# Patient Record
Sex: FEMALE | Race: WHITE | NOT HISPANIC OR LATINO
[De-identification: names, ages, dates, MRNs, and addresses within clinical notes are randomized per-mention and may not be internally consistent; named-entity substitution may affect disease eponyms.]

---

## 2021-11-18 ENCOUNTER — NON-APPOINTMENT (OUTPATIENT)
Age: 56
End: 2021-11-18

## 2021-12-01 PROBLEM — Z00.00 ENCOUNTER FOR PREVENTIVE HEALTH EXAMINATION: Status: ACTIVE | Noted: 2021-12-01

## 2021-12-06 ENCOUNTER — APPOINTMENT (OUTPATIENT)
Dept: OBGYN | Facility: CLINIC | Age: 56
End: 2021-12-06
Payer: COMMERCIAL

## 2021-12-06 VITALS
BODY MASS INDEX: 47.09 KG/M2 | WEIGHT: 293 LBS | DIASTOLIC BLOOD PRESSURE: 82 MMHG | HEIGHT: 66 IN | SYSTOLIC BLOOD PRESSURE: 147 MMHG

## 2021-12-06 DIAGNOSIS — Z78.9 OTHER SPECIFIED HEALTH STATUS: ICD-10-CM

## 2021-12-06 DIAGNOSIS — Z82.49 FAMILY HISTORY OF ISCHEMIC HEART DISEASE AND OTHER DISEASES OF THE CIRCULATORY SYSTEM: ICD-10-CM

## 2021-12-06 DIAGNOSIS — Z83.3 FAMILY HISTORY OF DIABETES MELLITUS: ICD-10-CM

## 2021-12-06 PROCEDURE — 99204 OFFICE O/P NEW MOD 45 MIN: CPT

## 2021-12-06 NOTE — HISTORY OF PRESENT ILLNESS
[FreeTextEntry1] : 56 year-old patient who had a Pap smear in November 2021 else here is here complaining of postmenopausal bleeding which is going on for almost a year and it has gotten worse.\par Patient states in November 2021 the blood was bright red.\par Patient denies any pain .\par Patient to obtain her Pap smear results and sonogram results from last year. [Abnormal Quantity] : abnormal quantity [TextBox_28] : obesity [Currently In Menopause] : currently in menopause [Menopause Age: ____] : age at menopause was [unfilled]

## 2021-12-06 NOTE — DISCUSSION/SUMMARY
[FreeTextEntry1] : Postmenopausal bleeding was discussed with patient in detail.\par Work-up was discussed with patient in detail,\par Recommend transvaginal sonogram, D&C was discussed with the patient.\par All questions were answered.

## 2021-12-14 PROBLEM — Z00.00 ENCOUNTER FOR PREVENTIVE HEALTH EXAMINATION: Noted: 2021-12-14

## 2021-12-27 ENCOUNTER — ASOB RESULT (OUTPATIENT)
Age: 56
End: 2021-12-27

## 2021-12-27 ENCOUNTER — APPOINTMENT (OUTPATIENT)
Dept: OBGYN | Facility: CLINIC | Age: 56
End: 2021-12-27
Payer: COMMERCIAL

## 2021-12-27 PROCEDURE — 76830 TRANSVAGINAL US NON-OB: CPT

## 2021-12-29 ENCOUNTER — NON-APPOINTMENT (OUTPATIENT)
Age: 56
End: 2021-12-29

## 2022-01-11 ENCOUNTER — APPOINTMENT (OUTPATIENT)
Dept: OBGYN | Facility: CLINIC | Age: 57
End: 2022-01-11
Payer: COMMERCIAL

## 2022-01-11 VITALS
WEIGHT: 293 LBS | SYSTOLIC BLOOD PRESSURE: 128 MMHG | HEIGHT: 66 IN | BODY MASS INDEX: 47.09 KG/M2 | DIASTOLIC BLOOD PRESSURE: 83 MMHG

## 2022-01-11 PROCEDURE — 99214 OFFICE O/P EST MOD 30 MIN: CPT

## 2022-01-11 NOTE — PLAN
[FreeTextEntry1] : PMB/ thickened Endometrial lining: Differential diagnosis including benign proliferative, polyp, precancerous tissue and malignancy discussed. Endometrial biopsy vs  D&C discussed. Due to orientation of cervix and visualization, it was recommended that the patient have D&C. Patient in agreement and procedure discussed. Patient to be booked for Hysteroscopy, D&C \par \par GYN counseling: Patient instructed to call for Unusual Pelvic pain,  UTI symptoms, irregular vaginal bleeding/discharge- Patient verbalized agreement\par \par F/U: patient to follow up post op\par \par

## 2022-01-11 NOTE — HISTORY OF PRESENT ILLNESS
[FreeTextEntry1] : 57yo female [id number] postmenopausal patient of Dr. Vega presents for surgical consult\par Patient reports PMB that started about 1 year ago with sporadic minimal dark spotting. IN November 2021 she noticed an increase of spotting with bright red blood. TVUS done 1 year ago at another office showed uterine lining was wnl. \par \par Denies HTN, DM, LEO\par \par TVUS in December 2021 endo lining 8.28, small posterior IM fibroid\par \par ROS: Denies fever/chills, HA, Cough/sore throat, CP, SOB, N/V, Diarrhea/Constipation, Pelvic pain, Urinary frequency/ urgency/ Incontinence, irregular discharge \par \par

## 2022-01-11 NOTE — PHYSICAL EXAM
[Chaperone Present] : A chaperone was present in the examining room during all aspects of the physical examination [Appropriately responsive] : appropriately responsive [Alert] : alert [No Acute Distress] : no acute distress [Soft] : soft [Non-tender] : non-tender [Non-distended] : non-distended [Oriented x3] : oriented x3 [Examination Of The Breasts] : a normal appearance [No Discharge] : no discharge [No Masses] : no breast masses were palpable [Normal] : normal [FreeTextEntry1] : Pan adiposity [FreeTextEntry5] : cervix difficult to reach with large speculum and anterior in orientation

## 2022-02-04 ENCOUNTER — OUTPATIENT (OUTPATIENT)
Dept: OUTPATIENT SERVICES | Facility: HOSPITAL | Age: 57
LOS: 1 days | End: 2022-02-04

## 2022-02-04 VITALS
WEIGHT: 293 LBS | TEMPERATURE: 98 F | HEIGHT: 66 IN | DIASTOLIC BLOOD PRESSURE: 80 MMHG | RESPIRATION RATE: 17 BRPM | HEART RATE: 78 BPM | OXYGEN SATURATION: 98 % | SYSTOLIC BLOOD PRESSURE: 164 MMHG

## 2022-02-04 DIAGNOSIS — N95.9 UNSPECIFIED MENOPAUSAL AND PERIMENOPAUSAL DISORDER: ICD-10-CM

## 2022-02-04 DIAGNOSIS — R93.89 ABNORMAL FINDINGS ON DIAGNOSTIC IMAGING OF OTHER SPECIFIED BODY STRUCTURES: ICD-10-CM

## 2022-02-04 DIAGNOSIS — Z98.891 HISTORY OF UTERINE SCAR FROM PREVIOUS SURGERY: Chronic | ICD-10-CM

## 2022-02-04 DIAGNOSIS — Z90.49 ACQUIRED ABSENCE OF OTHER SPECIFIED PARTS OF DIGESTIVE TRACT: Chronic | ICD-10-CM

## 2022-02-04 DIAGNOSIS — E66.01 MORBID (SEVERE) OBESITY DUE TO EXCESS CALORIES: ICD-10-CM

## 2022-02-04 DIAGNOSIS — R03.0 ELEVATED BLOOD-PRESSURE READING, WITHOUT DIAGNOSIS OF HYPERTENSION: ICD-10-CM

## 2022-02-04 DIAGNOSIS — Z98.890 OTHER SPECIFIED POSTPROCEDURAL STATES: Chronic | ICD-10-CM

## 2022-02-04 DIAGNOSIS — Z90.89 ACQUIRED ABSENCE OF OTHER ORGANS: Chronic | ICD-10-CM

## 2022-02-04 LAB
ANION GAP SERPL CALC-SCNC: 13 MMOL/L — SIGNIFICANT CHANGE UP (ref 7–14)
BUN SERPL-MCNC: 13 MG/DL — SIGNIFICANT CHANGE UP (ref 7–23)
CALCIUM SERPL-MCNC: 10.1 MG/DL — SIGNIFICANT CHANGE UP (ref 8.4–10.5)
CHLORIDE SERPL-SCNC: 104 MMOL/L — SIGNIFICANT CHANGE UP (ref 98–107)
CO2 SERPL-SCNC: 24 MMOL/L — SIGNIFICANT CHANGE UP (ref 22–31)
CREAT SERPL-MCNC: 0.77 MG/DL — SIGNIFICANT CHANGE UP (ref 0.5–1.3)
GLUCOSE SERPL-MCNC: 99 MG/DL — SIGNIFICANT CHANGE UP (ref 70–99)
HCT VFR BLD CALC: 48.4 % — HIGH (ref 34.5–45)
HGB BLD-MCNC: 16.1 G/DL — HIGH (ref 11.5–15.5)
MCHC RBC-ENTMCNC: 29.3 PG — SIGNIFICANT CHANGE UP (ref 27–34)
MCHC RBC-ENTMCNC: 33.3 GM/DL — SIGNIFICANT CHANGE UP (ref 32–36)
MCV RBC AUTO: 88.2 FL — SIGNIFICANT CHANGE UP (ref 80–100)
NRBC # BLD: 0 /100 WBCS — SIGNIFICANT CHANGE UP
NRBC # FLD: 0 K/UL — SIGNIFICANT CHANGE UP
PLATELET # BLD AUTO: 208 K/UL — SIGNIFICANT CHANGE UP (ref 150–400)
POTASSIUM SERPL-MCNC: 4.2 MMOL/L — SIGNIFICANT CHANGE UP (ref 3.5–5.3)
POTASSIUM SERPL-SCNC: 4.2 MMOL/L — SIGNIFICANT CHANGE UP (ref 3.5–5.3)
RBC # BLD: 5.49 M/UL — HIGH (ref 3.8–5.2)
RBC # FLD: 12.3 % — SIGNIFICANT CHANGE UP (ref 10.3–14.5)
SODIUM SERPL-SCNC: 141 MMOL/L — SIGNIFICANT CHANGE UP (ref 135–145)
WBC # BLD: 6.59 K/UL — SIGNIFICANT CHANGE UP (ref 3.8–10.5)
WBC # FLD AUTO: 6.59 K/UL — SIGNIFICANT CHANGE UP (ref 3.8–10.5)

## 2022-02-04 RX ORDER — SODIUM CHLORIDE 9 MG/ML
1000 INJECTION, SOLUTION INTRAVENOUS
Refills: 0 | Status: DISCONTINUED | OUTPATIENT
Start: 2022-02-21 | End: 2022-03-07

## 2022-02-04 NOTE — H&P PST ADULT - PROBLEM SELECTOR PLAN 1
Pt is tentatively scheduled for  Dilation and curettage Hysteroscopy with myosure on 02/21/22.  Pepcid provided for GI prophylaxis.   Pre op instructions given  Pt strongly advised to follow up with surgeon to discuss COVID testing requirements prior to procedure.

## 2022-02-04 NOTE — H&P PST ADULT - NSICDXPASTMEDICALHX_GEN_ALL_CORE_FT
PAST MEDICAL HISTORY:  2019 novel coronavirus disease (COVID-19) 03/2020- with mild respiratory complications    Obesity

## 2022-02-04 NOTE — H&P PST ADULT - NSICDXFAMILYHX_GEN_ALL_CORE_FT
FAMILY HISTORY:  FH: hypertension    Father  Still living? Yes, Estimated age: Age Unknown  FH: type 2 diabetes, Age at diagnosis: Age Unknown

## 2022-02-04 NOTE — H&P PST ADULT - NSICDXPASTSURGICALHX_GEN_ALL_CORE_FT
PAST SURGICAL HISTORY:  H/O  section x3    S/P cholecystectomy     S/P knee surgery     S/P tonsillectomy

## 2022-02-04 NOTE — H&P PST ADULT - MALLAMPATI CLASS
Class II - visualization of the soft palate, fauces, and uvula short neck - 18 cm/Class II - visualization of the soft palate, fauces, and uvula

## 2022-02-04 NOTE — H&P PST ADULT - HISTORY OF PRESENT ILLNESS
56 year old female with Obesity, Anxiety presents to PST with pre op diagnosis of abnormal findings on pelvic sonogram, for pre op evaluation prior to scheduled surgery- Dilation and curettage Hysteroscopy with myosure. Pt reports of abnormal post menopausal bleeding.

## 2022-02-04 NOTE — H&P PST ADULT - ASSESSMENT
pre op diagnosis of abnormal findings on pelvic sonogram, for pre op evaluation prior to scheduled surgery- Dilation and curettage Hysteroscopy with myosure.

## 2022-02-04 NOTE — H&P PST ADULT - PROBLEM SELECTOR PLAN 2
LEO Precautions  OR booking notifies Patient has elevated BP at PST x2, patient has never diagnosed with HTN.    Patient with morbid Obesity and elevated BP- requesting medical evaluation prior to surgery.

## 2022-02-18 NOTE — ASU PATIENT PROFILE, ADULT - FALL HARM RISK - UNIVERSAL INTERVENTIONS
Bed in lowest position, wheels locked, appropriate side rails in place/Call bell, personal items and telephone in reach/Instruct patient to call for assistance before getting out of bed or chair/Non-slip footwear when patient is out of bed/[place] to call system/Physically safe environment - no spills, clutter or unnecessary equipment/Purposeful Proactive Rounding/Room/bathroom lighting operational, light cord in reach

## 2022-02-20 ENCOUNTER — TRANSCRIPTION ENCOUNTER (OUTPATIENT)
Age: 57
End: 2022-02-20

## 2022-02-21 ENCOUNTER — OUTPATIENT (OUTPATIENT)
Dept: OUTPATIENT SERVICES | Facility: HOSPITAL | Age: 57
LOS: 1 days | Discharge: HOME | End: 2022-02-21
Payer: COMMERCIAL

## 2022-02-21 ENCOUNTER — RESULT REVIEW (OUTPATIENT)
Age: 57
End: 2022-02-21

## 2022-02-21 VITALS
SYSTOLIC BLOOD PRESSURE: 151 MMHG | DIASTOLIC BLOOD PRESSURE: 85 MMHG | HEART RATE: 84 BPM | WEIGHT: 293 LBS | RESPIRATION RATE: 18 BRPM | HEIGHT: 66 IN | TEMPERATURE: 98 F | OXYGEN SATURATION: 97 %

## 2022-02-21 VITALS
OXYGEN SATURATION: 98 % | RESPIRATION RATE: 19 BRPM | SYSTOLIC BLOOD PRESSURE: 156 MMHG | DIASTOLIC BLOOD PRESSURE: 76 MMHG | HEART RATE: 93 BPM

## 2022-02-21 DIAGNOSIS — Z90.49 ACQUIRED ABSENCE OF OTHER SPECIFIED PARTS OF DIGESTIVE TRACT: Chronic | ICD-10-CM

## 2022-02-21 DIAGNOSIS — R93.89 ABNORMAL FINDINGS ON DIAGNOSTIC IMAGING OF OTHER SPECIFIED BODY STRUCTURES: ICD-10-CM

## 2022-02-21 DIAGNOSIS — Z98.890 OTHER SPECIFIED POSTPROCEDURAL STATES: Chronic | ICD-10-CM

## 2022-02-21 DIAGNOSIS — Z98.891 HISTORY OF UTERINE SCAR FROM PREVIOUS SURGERY: Chronic | ICD-10-CM

## 2022-02-21 DIAGNOSIS — Z90.89 ACQUIRED ABSENCE OF OTHER ORGANS: Chronic | ICD-10-CM

## 2022-02-21 PROCEDURE — 88305 TISSUE EXAM BY PATHOLOGIST: CPT | Mod: 26

## 2022-02-21 PROCEDURE — 58558 HYSTEROSCOPY BIOPSY: CPT

## 2022-02-21 RX ORDER — IBUPROFEN 200 MG
1 TABLET ORAL
Qty: 0 | Refills: 0 | DISCHARGE

## 2022-02-21 RX ORDER — AMLODIPINE BESYLATE 2.5 MG/1
1 TABLET ORAL
Qty: 0 | Refills: 0 | DISCHARGE

## 2022-02-21 RX ORDER — ACETAMINOPHEN 500 MG
2 TABLET ORAL
Qty: 0 | Refills: 0 | DISCHARGE

## 2022-02-21 RX ORDER — CHOLECALCIFEROL (VITAMIN D3) 125 MCG
0 CAPSULE ORAL
Qty: 0 | Refills: 0 | DISCHARGE

## 2022-02-21 RX ORDER — ZINC SULFATE TAB 220 MG (50 MG ZINC EQUIVALENT) 220 (50 ZN) MG
0 TAB ORAL
Qty: 0 | Refills: 0 | DISCHARGE

## 2022-02-21 NOTE — ASU DISCHARGE PLAN (ADULT/PEDIATRIC) - CARE PROVIDER_API CALL
Paula Dodson ()  [facility]  [phone] [place] [id number]  Phone: [phone]  Fax: [phone]  Established Patient  Follow Up Time:

## 2022-02-21 NOTE — ASU DISCHARGE PLAN (ADULT/PEDIATRIC) - MEDICATION INSTRUCTIONS
You received intravenous tylenol in the operating room at 10:30am You may take additional tylenol products after 4pm.

## 2022-02-21 NOTE — ASU DISCHARGE PLAN (ADULT/PEDIATRIC) - NS MD DC FALL RISK RISK
For information on Fall & Injury Prevention, visit: [email or web address] OR  [email or web address] OR  https://www.cdc.gov/steadi/patient.html

## 2022-02-21 NOTE — ASU DISCHARGE PLAN (ADULT/PEDIATRIC) - CALL YOUR DOCTOR IF YOU HAVE ANY OF THE FOLLOWING:
Bleeding that does not stop/Pain not relieved by Medications/Fever greater than (need to indicate Fahrenheit or Celsius) Bleeding that does not stop/Pain not relieved by Medications/Fever greater than (need to indicate Fahrenheit or Celsius)/Nausea and vomiting that does not stop/Unable to urinate

## 2022-02-21 NOTE — BRIEF OPERATIVE NOTE - OPERATION/FINDINGS
anteverted uterus difficult to appreciate due to body habitus. Uterus sounded to 5cm. On hysteroscopy, redundant polypoid like tissue around the entire endometrium. only one ostia appreciated

## 2022-02-21 NOTE — ASU DISCHARGE PLAN (ADULT/PEDIATRIC) - ACTIVITY LEVEL
No intercourse No heavy lifting/Nothing per vagina/No tub baths/No douching/No tampons/No intercourse

## 2022-02-22 ENCOUNTER — NON-APPOINTMENT (OUTPATIENT)
Age: 57
End: 2022-02-22

## 2022-02-22 PROBLEM — E66.9 OBESITY, UNSPECIFIED: Chronic | Status: ACTIVE | Noted: 2022-02-04

## 2022-02-22 PROBLEM — U07.1 COVID-19: Chronic | Status: ACTIVE | Noted: 2022-02-04

## 2022-02-25 ENCOUNTER — NON-APPOINTMENT (OUTPATIENT)
Age: 57
End: 2022-02-25

## 2022-02-25 LAB — SURGICAL PATHOLOGY STUDY: SIGNIFICANT CHANGE UP

## 2022-02-28 ENCOUNTER — NON-APPOINTMENT (OUTPATIENT)
Age: 57
End: 2022-02-28

## 2022-03-02 ENCOUNTER — NON-APPOINTMENT (OUTPATIENT)
Age: 57
End: 2022-03-02

## 2022-03-09 ENCOUNTER — APPOINTMENT (OUTPATIENT)
Dept: OBGYN | Facility: CLINIC | Age: 57
End: 2022-03-09
Payer: COMMERCIAL

## 2022-03-09 VITALS
HEIGHT: 66 IN | BODY MASS INDEX: 47.09 KG/M2 | SYSTOLIC BLOOD PRESSURE: 128 MMHG | WEIGHT: 293 LBS | DIASTOLIC BLOOD PRESSURE: 74 MMHG

## 2022-03-09 DIAGNOSIS — Z98.890 OTHER SPECIFIED POSTPROCEDURAL STATES: ICD-10-CM

## 2022-03-09 PROCEDURE — 99213 OFFICE O/P EST LOW 20 MIN: CPT

## 2022-03-09 NOTE — HISTORY OF PRESENT ILLNESS
[FreeTextEntry1] : 57yo female postmenopausal  presents for follow up from D&C on 2/21\par Patient reports back pain since the procedure states it has been there since January but worse since the procedure. reports 2 days of spotting one week after procedure.\par \par Pathology: benign endocervical cells, rare benign stromal tissue devoid of epithelium \par \par ROS: Denies fever/chills, HA, Cough/sore throat, CP, SOB, N/V, Diarrhea/Constipation, Pelvic pain, Urinary frequency/ urgency/ Incontinence, irregular discharge or vaginal bleeding\par \par \par \par

## 2022-03-09 NOTE — PLAN
[FreeTextEntry1] : Endometrial thickening: Patient again counseled on the ddx that cause endometrial thickening. Patient counseled of repeat TVUS and/or repeat D&C vs Consult with GYNONC. Patient would like a consult with GYNONC. Patient given Dr. Soler's information and patient information sent to Dr. Soler\par \par Post op: proper healing noted, recommend PCP or ortho for back pain. cleared for intercourse\par \par GYN counseling: Patient instructed to call for Unusual Pelvic pain,  UTI symptoms, irregular vaginal bleeding/discharge- Patient verbalized agreement\par \par F/U: patient to follow up as needed\par

## 2022-03-24 ENCOUNTER — TRANSCRIPTION ENCOUNTER (OUTPATIENT)
Age: 57
End: 2022-03-24

## 2022-04-01 ENCOUNTER — APPOINTMENT (OUTPATIENT)
Dept: GYNECOLOGIC ONCOLOGY | Facility: CLINIC | Age: 57
End: 2022-04-01
Payer: COMMERCIAL

## 2022-04-01 VITALS
HEART RATE: 87 BPM | WEIGHT: 291 LBS | DIASTOLIC BLOOD PRESSURE: 91 MMHG | HEIGHT: 66 IN | BODY MASS INDEX: 46.77 KG/M2 | SYSTOLIC BLOOD PRESSURE: 145 MMHG

## 2022-04-01 DIAGNOSIS — N95.0 POSTMENOPAUSAL BLEEDING: ICD-10-CM

## 2022-04-01 DIAGNOSIS — Z78.9 OTHER SPECIFIED HEALTH STATUS: ICD-10-CM

## 2022-04-01 PROCEDURE — 99204 OFFICE O/P NEW MOD 45 MIN: CPT

## 2022-04-01 RX ORDER — ASCORBIC ACID 500 MG
TABLET ORAL
Refills: 0 | Status: ACTIVE | COMMUNITY

## 2022-04-01 RX ORDER — COLD-HOT PACK
EACH MISCELLANEOUS
Refills: 0 | Status: ACTIVE | COMMUNITY

## 2022-04-01 RX ORDER — AMLODIPINE BESYLATE 5 MG/1
TABLET ORAL
Refills: 0 | Status: ACTIVE | COMMUNITY

## 2022-04-01 NOTE — OB HISTORY
[Total Preg ___] : : [unfilled] [ ___] : [unfilled]  section delivery(s) [Definite ___ (Date)] : the last menstrual period was [unfilled] [Menarche Age ____] : age at menarche was [unfilled] [Menopause  Age ____] : menopause occurred at age [unfilled]

## 2022-04-01 NOTE — HISTORY OF PRESENT ILLNESS
[FreeTextEntry1] : Referred by Dr. Dodson\par \par 55 yo with PMB and thickened endometrium.  She saw Dr. Vega in 12/2021 for the first time with report of PMB x 1 year with heaviest episode in 11/2021.  Workup included pelvic sonogram which revealed EMS of 8mm.  Prior to this sonogram in 2020 revealed EMS of 3mm.  She was then referred to Dr. Dodson, and scheduled for D&C hysteroscopy.  D&C was performed in 2/2022 with final pathology revealing rare benign endometrial tissue from SPEEDY, benign endocervical and squamous mucosa.  Review of operative report reveals polyploid tissue throughout the endometrial cavity.\par \par Since the D&C, she has had no further bleeding.  She has been working on weight loss program for past 6 months.\par \angelo Denies abdominal/pelvic pain, dyspnea or chest pain, nausea/vomiting, changes in bowel habits or urination, lower extremity edema or pain.\par

## 2022-04-01 NOTE — DISCUSSION/SUMMARY
[FreeTextEntry1] : 57 yo with PMB and thickened endometrium\par \par I discussed my recommendations with Ms. Mena in detail.\par \par I discussed the workup of PMB and thickened endometrium to evaluate for endometrial pathology.  The recent D&C reveals benign findings, however histology shows rare endometrial tissue from SPEEDY.  As a result, the pathology is overall nondiagnostic.  Options at this time include repeat D&C vs observation with follow up sonogram and monitoring of symptoms vs hysterectomy.  Risks/benefits of each option discussed in detail.  \par \par She desires observation.  Recommend f/u sonogram in 6/2022.  She will call Dr. Dodson's office to schedule sonogram in their office.  Further management pending results of sonogram and any interval symptoms.

## 2022-04-01 NOTE — PHYSICAL EXAM
[Chaperone Present] : A chaperone was present in the examining room during all aspects of the physical examination [Normal] : Recto-Vaginal Exam: Normal [de-identified] : unable to visualize

## 2022-04-08 ENCOUNTER — NON-APPOINTMENT (OUTPATIENT)
Age: 57
End: 2022-04-08

## 2022-04-13 ENCOUNTER — APPOINTMENT (OUTPATIENT)
Dept: OBGYN | Facility: CLINIC | Age: 57
End: 2022-04-13
Payer: COMMERCIAL

## 2022-04-13 ENCOUNTER — ASOB RESULT (OUTPATIENT)
Age: 57
End: 2022-04-13

## 2022-04-13 PROCEDURE — 76830 TRANSVAGINAL US NON-OB: CPT

## 2022-04-28 DIAGNOSIS — R93.89 ABNORMAL FINDINGS ON DIAGNOSTIC IMAGING OF OTHER SPECIFIED BODY STRUCTURES: ICD-10-CM

## 2022-05-04 ENCOUNTER — NON-APPOINTMENT (OUTPATIENT)
Age: 57
End: 2022-05-04

## 2022-06-06 ENCOUNTER — NON-APPOINTMENT (OUTPATIENT)
Age: 57
End: 2022-06-06

## 2022-12-14 NOTE — ASU PATIENT PROFILE, ADULT - HOW PATIENT ADDRESSED, PROFILE
Keyla Complex Repair And Tissue Cultured Epidermal Autograft Text: The defect edges were debeveled with a #15 scalpel blade.  The primary defect was closed partially with a complex linear closure.  Given the location of the defect, shape of the defect and the proximity to free margins an tissue cultured epidermal autograft was deemed most appropriate to repair the remaining defect.  The graft was trimmed to fit the size of the remaining defect.  The graft was then placed in the primary defect, oriented appropriately, and sutured into place.

## 2023-02-07 ENCOUNTER — FORM ENCOUNTER (OUTPATIENT)
Age: 58
End: 2023-02-07

## 2023-02-07 ENCOUNTER — APPOINTMENT (OUTPATIENT)
Dept: ORTHOPEDIC SURGERY | Facility: CLINIC | Age: 58
End: 2023-02-07
Payer: COMMERCIAL

## 2023-02-07 VITALS — WEIGHT: 291 LBS | HEIGHT: 66 IN | BODY MASS INDEX: 46.77 KG/M2

## 2023-02-07 DIAGNOSIS — Z78.9 OTHER SPECIFIED HEALTH STATUS: ICD-10-CM

## 2023-02-07 PROCEDURE — 99204 OFFICE O/P NEW MOD 45 MIN: CPT

## 2023-02-07 NOTE — HISTORY OF PRESENT ILLNESS
[7] : 7 [5] : 5 [Full time] : Work status: full time [de-identified] : 02/07/2023: 57 year old F here for eval of R foot pain. Started with feeling like rock under forefoot 5/2022 Also has numbness/ tingling into toes, worst in big toe. Previous Dr thought she had a neuroma. Had tried CSI into suspected neuroma but did not get relief. Has done PT with some help. Has not tried toe/ foot pad. Got EMG which she said showed "a pinched nerve in her back". Switched to Cymbalta from Neurontin by Neuro.\par \par  No previous L Spine MRI. 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

## 2023-02-07 NOTE — PHYSICAL EXAM
[Right] : right foot and ankle [Positive Tinel sign at _____] : Positive Tinel sign at [unfilled] [2+] : dorsalis pedis pulse: 2+ [2nd] : 2nd [] : no intermetatarsal space tenderness

## 2023-02-07 NOTE — DATA REVIEWED
[MRI] : MRI [Right] : of the right [Foot] : foot [Report was reviewed and noted in the chart] : The report was reviewed and noted in the chart [I independently reviewed and interpreted images and report] : I independently reviewed and interpreted images and report [I reviewed the films/CD] : I reviewed the films/CD [FreeTextEntry1] : Moderate second web space neuroma and mild first through third intermetatarsal\par bursitis.\par \par Moderate subcutaneous edema at the dorsum of the foot.\par \par Mild osteoarthritic changes at the first MP joint.

## 2023-02-07 NOTE — DISCUSSION/SUMMARY
[de-identified] : Met pads\par Would like to review EMG/NCV\par Offered CSI to tarsal tunnel\par Will get MRI L spine to eval possible nerve compression\par Consult Pain Mngmt after MRI for selective L5-S1 nerve block\par

## 2023-02-08 ENCOUNTER — APPOINTMENT (OUTPATIENT)
Dept: MRI IMAGING | Facility: CLINIC | Age: 58
End: 2023-02-08
Payer: COMMERCIAL

## 2023-02-08 PROCEDURE — 72148 MRI LUMBAR SPINE W/O DYE: CPT

## 2023-03-01 ENCOUNTER — APPOINTMENT (OUTPATIENT)
Dept: PAIN MANAGEMENT | Facility: CLINIC | Age: 58
End: 2023-03-01
Payer: COMMERCIAL

## 2023-03-01 VITALS — HEIGHT: 66 IN | WEIGHT: 293 LBS | BODY MASS INDEX: 47.09 KG/M2

## 2023-03-01 PROCEDURE — 99204 OFFICE O/P NEW MOD 45 MIN: CPT

## 2023-03-01 NOTE — PHYSICAL EXAM
[de-identified] : PHYSICAL EXAM\par \par Constitutional: \par Appears well, no apparent distress\par Ability to communicate: Normal\par Respiratory: non-labored breathing\par Skin: no rash noted\par Head: normocephalic, atraumatic\par Neck: no visible thyroid enlargement\par Eyes: extraocular movements intact\par Neurologic: alert and oriented x3\par Psychiatric: normal mood, affect, and behavior\par \par \par Back, including spine: Range of motion of the thoracic and lumbar spine is as follows: Diminished range of motion in all planes.  MMT 5/5 BL LE.  Sensation is intact to light touch and pin prick BL LE.  Negative Straight leg raise testing bilaterally.  Negatvie Kemps maneuver bilaterally.  Normal Gait.\par \par \par Assessment:\par Lumbago\par \par Plan:\par After discussing various treatment options with the patient including but not limited to oral medications, physical therapy, exercise modalities as well as interventional spinal injections, we have decided with the following plan:\par  \par Continue home exercises, stretching, activity modification, physical therapy, and conservative care.\par \par \par

## 2023-03-01 NOTE — HISTORY OF PRESENT ILLNESS
[Lower back] : lower back [7] : 7 [5] : 5 [Burning] : burning [Constant] : constant [Household chores] : household chores [Leisure] : leisure [Sleep] : sleep [Nothing helps with pain getting better] : Nothing helps with pain getting better [Standing] : standing [Walking] : walking [] : no [FreeTextEntry6] : numbness [FreeTextEntry7] : right foot

## 2023-03-01 NOTE — DISCUSSION/SUMMARY
[de-identified] : emg with positive radicular finding\par MRI WNL at l5-s1\par clinically more cw localized foot pathology\par will call for diagnostic in the future\par recommend increaseing cymbalta

## 2023-03-08 ENCOUNTER — NON-APPOINTMENT (OUTPATIENT)
Age: 58
End: 2023-03-08

## 2023-03-13 ENCOUNTER — NON-APPOINTMENT (OUTPATIENT)
Age: 58
End: 2023-03-13

## 2023-03-14 ENCOUNTER — NON-APPOINTMENT (OUTPATIENT)
Age: 58
End: 2023-03-14

## 2023-03-24 ENCOUNTER — APPOINTMENT (OUTPATIENT)
Dept: MRI IMAGING | Facility: CLINIC | Age: 58
End: 2023-03-24
Payer: COMMERCIAL

## 2023-03-24 PROCEDURE — 73723 MRI JOINT LWR EXTR W/O&W/DYE: CPT | Mod: RT

## 2023-03-30 ENCOUNTER — APPOINTMENT (OUTPATIENT)
Dept: ORTHOPEDIC SURGERY | Facility: CLINIC | Age: 58
End: 2023-03-30
Payer: COMMERCIAL

## 2023-03-30 VITALS — WEIGHT: 293 LBS | BODY MASS INDEX: 47.09 KG/M2 | HEIGHT: 66 IN

## 2023-03-30 PROCEDURE — 99214 OFFICE O/P EST MOD 30 MIN: CPT

## 2023-03-30 NOTE — DATA REVIEWED
[Lumbar Spine] : lumbar spine [MRI] : MRI [Right] : of the right [Ankle] : ankle [I independently reviewed and interpreted images and report] : I independently reviewed and interpreted images and report [I reviewed the films/CD and agree] : I reviewed the films/CD and agree [FreeTextEntry1] : 2/8/23: Mild degenerative disc disease at L3-4 with a right foraminal disc herniation that does not compress the right L3 \par nerve root. [FreeTextEntry2] : 3/24/23: 1. 2 cm x 1 cm ganglion within the tarsal tunnel with most medial margin of the posterior facet and subtalar \par joint with mass effect on the plantar calcaneal branches of the posterior tibial nerve. No muscle atrophy.\par 2. Scarring of the tarsal sinus ligaments and fat, which can be seen with sinus tarsi syndrome.\par 3. Chronic tear of the lateral ligaments.\par 4. Chronic tear of the dorsal talonavicular joint capsule.\par 5. Achilles tendinopathy with peritendinous edema.\par 6. Posterior tibial tendinopathy with tenosynovitis.

## 2023-03-30 NOTE — CONSULT LETTER
[Dear  ___] : Dear  [unfilled], [Consult Letter:] : I had the pleasure of evaluating your patient, [unfilled]. [Consult Closing:] : Thank you very much for allowing me to participate in the care of this patient.  If you have any questions, please do not hesitate to contact me. [Sincerely,] : Sincerely, [FreeTextEntry3] : Ruben Meier M.D.

## 2023-03-30 NOTE — HISTORY OF PRESENT ILLNESS
[7] : 7 [5] : 5 [Full time] : Work status: full time [de-identified] : 02/07/2023: 57 year old F here for eval of R foot pain. Started with feeling like rock under forefoot 5/2022 Also has numbness/ tingling into toes, worst in big toe. Previous Dr thought she had a neuroma. Had tried CSI into suspected neuroma but did not get relief. Has done PT with some help. Has not tried toe/ foot pad. Got EMG which she said showed "a pinched nerve in her back". Switched to Cymbalta from Neurontin by Neuro.\par \par  No previous L Spine MRI. \par \par No h/o neuropathy \par \par 3/30/23: f/u  [FreeTextEntry1] : R foot [FreeTextEntry5] : R foot pain - no injury [de-identified] : podiatrist, neurologist [de-identified] : MRI, Ultrasound [de-identified] : cymbalta 20 mg

## 2023-03-30 NOTE — DISCUSSION/SUMMARY
[Surgical risks reviewed] : Surgical risks reviewed [de-identified] : The risks, benefits, alternatives have been discussed.  The risks include but are not limited to infection, bleeding, injury to small nerves and blood vessels, pain, stiffness, progression, dvt, PE, amputation and death.\par \par

## 2023-03-30 NOTE — PHYSICAL EXAM
[Right] : right foot and ankle [2nd] : 2nd [Positive Tinel sign at _____] : Positive Tinel sign at [unfilled] [2+] : dorsalis pedis pulse: 2+ [] : no intermetatarsal space tenderness

## 2023-04-10 ENCOUNTER — NON-APPOINTMENT (OUTPATIENT)
Age: 58
End: 2023-04-10

## 2023-04-13 RX ORDER — HYDROCODONE BITARTRATE AND ACETAMINOPHEN 5; 325 MG/1; MG/1
5-325 TABLET ORAL
Qty: 20 | Refills: 0 | Status: ACTIVE | COMMUNITY
Start: 2023-04-13 | End: 1900-01-01

## 2023-04-17 ENCOUNTER — RESULT REVIEW (OUTPATIENT)
Age: 58
End: 2023-04-17

## 2023-04-17 ENCOUNTER — APPOINTMENT (OUTPATIENT)
Age: 58
End: 2023-04-17
Payer: COMMERCIAL

## 2023-04-17 PROCEDURE — 28060 PARTIAL REMOVAL FOOT FASCIA: CPT | Mod: 59,RT

## 2023-04-17 PROCEDURE — 64450 NJX AA&/STRD OTHER PN/BRANCH: CPT | Mod: 59,RT

## 2023-04-17 PROCEDURE — 28035 DECOMPRESSION OF TIBIA NERVE: CPT | Mod: 59,RT

## 2023-04-17 PROCEDURE — 27614 BIOPSY LOWER LEG SOFT TISSUE: CPT | Mod: RT

## 2023-04-17 PROCEDURE — 29515 APPLICATION SHORT LEG SPLINT: CPT | Mod: 59,RT

## 2023-04-18 ENCOUNTER — NON-APPOINTMENT (OUTPATIENT)
Age: 58
End: 2023-04-18

## 2023-04-27 ENCOUNTER — APPOINTMENT (OUTPATIENT)
Dept: ORTHOPEDIC SURGERY | Facility: CLINIC | Age: 58
End: 2023-04-27
Payer: COMMERCIAL

## 2023-04-27 DIAGNOSIS — E66.9 OBESITY, UNSPECIFIED: ICD-10-CM

## 2023-04-27 PROCEDURE — L4360 PNEUMAT WALKING BOOT PRE CST: CPT | Mod: RT

## 2023-04-27 PROCEDURE — 99024 POSTOP FOLLOW-UP VISIT: CPT

## 2023-04-27 NOTE — PHYSICAL EXAM
[Right] : right foot and ankle [2nd] : 2nd [Positive Tinel sign at _____] : Positive Tinel sign at [unfilled] [2+] : dorsalis pedis pulse: 2+ [] : no intermetatarsal space tenderness [de-identified] : decreased to hallux  (baseline)

## 2023-04-27 NOTE — HISTORY OF PRESENT ILLNESS
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 Started with feeling like rock under forefoot 5/2022 Also has numbness/ tingling into toes, worst in big toe. Previous Dr thought she had a neuroma. Had tried CSI into suspected neuroma but did not get relief. Has done PT with some help. Has not tried toe/ foot pad. Got EMG which she said showed "a pinched nerve in her back". Switched to Cymbalta from Neurontin by Neuro.\par \par  No previous L Spine MRI. \par \par No h/o neuropathy \par \par 3/30/23: f/u \par \par 4/17/23  R ganglion excision/tarsal tunnel/plantar fascia\par \par 04/27/23: 1st PO f/u. No F/C. Pt states slight decrease in pain. No nausea since sx. Pt states feels bubbles on the back of her calf.  [FreeTextEntry1] : R foot [FreeTextEntry5] : R foot pain - no injury [FreeTextEntry6] : b [de-identified] : podiatrist, neurologist [de-identified] : MRI, Ultrasound [de-identified] : cymbalta 20 mg

## 2023-05-03 RX ORDER — CEPHALEXIN 500 MG/1
500 TABLET ORAL
Qty: 40 | Refills: 0 | Status: ACTIVE | COMMUNITY
Start: 2023-05-03 | End: 1900-01-01

## 2023-05-04 ENCOUNTER — APPOINTMENT (OUTPATIENT)
Dept: ORTHOPEDIC SURGERY | Facility: CLINIC | Age: 58
End: 2023-05-04
Payer: COMMERCIAL

## 2023-05-04 PROCEDURE — 99024 POSTOP FOLLOW-UP VISIT: CPT

## 2023-05-04 NOTE — PHYSICAL EXAM
[Right] : right foot and ankle [Positive Tinel sign at _____] : Positive Tinel sign at [unfilled] [2+] : dorsalis pedis pulse: 2+ [Mild] : mild diffused ankle swelling [] : no achilles tendon insertion tenderness [de-identified] : decreased to hallux  (baseline)

## 2023-05-04 NOTE — DISCUSSION/SUMMARY
[de-identified] : Continue WB in cam boot\par continue keflex\par new dressing applied\par Hold PT\par call for any fever >101 or increased drainage \par F/U 1 week

## 2023-05-04 NOTE — HISTORY OF PRESENT ILLNESS
[7] : 7 [3] : 3 [Full time] : Work status: full time [Burning] : burning [Tightness] : tightness [Tingling] : tingling [de-identified] : 02/07/2023: 57 year old F here for eval of R foot pain. Started with feeling like rock under forefoot 5/2022 Also has numbness/ tingling into toes, worst in big toe. Previous Dr thought she had a neuroma. Had tried CSI into suspected neuroma but did not get relief. Has done PT with some help. Has not tried toe/ foot pad. Got EMG which she said showed "a pinched nerve in her back". Switched to Cymbalta from Neurontin by Neuro.\par \par  No previous L Spine MRI. \par \par No h/o neuropathy \par \par 3/30/23: f/u \par \par 4/17/23  R ganglion excision/tarsal tunnel/plantar fascia\par \par 04/27/23: 1st PO f/u. No F/C. Pt states slight decrease in pain. No nausea since sx. Pt states feels bubbles on the back of her calf. \par 05/04/23: 2nd PO f/u:  Pt is concerned about redness in the incision area. Continued burning to the big toe, feels that has gotten worse. She reports no fevers/chills. She started keflex yesterday, notes improvement in the redness. WBAT in the CAM boot [FreeTextEntry1] : R foot [FreeTextEntry5] : R foot pain - no injury [de-identified] : podiatrist, neurologist [de-identified] : MRI, Ultrasound [de-identified] : cymbalta 20 mg

## 2023-05-11 ENCOUNTER — APPOINTMENT (OUTPATIENT)
Dept: ORTHOPEDIC SURGERY | Facility: CLINIC | Age: 58
End: 2023-05-11
Payer: COMMERCIAL

## 2023-05-11 PROCEDURE — 99024 POSTOP FOLLOW-UP VISIT: CPT

## 2023-05-11 NOTE — HISTORY OF PRESENT ILLNESS
[7] : 7 [3] : 3 [Burning] : burning [Tightness] : tightness [Tingling] : tingling [Full time] : Work status: full time [de-identified] : 02/07/2023: 57 year old F here for eval of R foot pain. Started with feeling like rock under forefoot 5/2022 Also has numbness/ tingling into toes, worst in big toe. Previous Dr thought she had a neuroma. Had tried CSI into suspected neuroma but did not get relief. Has done PT with some help. Has not tried toe/ foot pad. Got EMG which she said showed "a pinched nerve in her back". Switched to Cymbalta from Neurontin by Neuro.\par \par  No previous L Spine MRI. \par \par No h/o neuropathy \par \par 3/30/23: f/u \par \par 4/17/23  R ganglion excision/tarsal tunnel/plantar fascia\par \par 04/27/23: 1st PO f/u. No F/C. Pt states slight decrease in pain. No nausea since sx. Pt states feels bubbles on the back of her calf. \par 05/04/23: 2nd PO f/u:  Pt is concerned about redness in the incision area. Continued burning to the big toe, feels that has gotten worse. She reports no fevers/chills. She started keflex yesterday, notes improvement in the redness. WBAT in the CAM boot\par 05/11/23: Pain level has decrease. bruising and swelling has decrease. Overall, improvement. No f/c [FreeTextEntry1] : R foot [FreeTextEntry5] : R foot pain - no injury [de-identified] : podiatrist, neurologist [de-identified] : MRI, Ultrasound [de-identified] : cymbalta 20 mg

## 2023-05-11 NOTE — PHYSICAL EXAM
[Right] : right foot and ankle [Positive Tinel sign at _____] : Positive Tinel sign at [unfilled] [2+] : dorsalis pedis pulse: 2+ [] : no achilles tendon insertion tenderness [de-identified] : decreased to hallux  (baseline)

## 2023-05-11 NOTE — DISCUSSION/SUMMARY
[de-identified] : Continue WB in cam boot w/ arch support\par finish keflex\par new dressing applied\par Rewstart PT\par call for any fever >101 or increased drainage \par F/U 1 week

## 2023-05-18 ENCOUNTER — APPOINTMENT (OUTPATIENT)
Dept: ORTHOPEDIC SURGERY | Facility: CLINIC | Age: 58
End: 2023-05-18
Payer: COMMERCIAL

## 2023-05-18 PROCEDURE — 99024 POSTOP FOLLOW-UP VISIT: CPT

## 2023-05-18 NOTE — DISCUSSION/SUMMARY
[de-identified] : Patient travelling to [facility], cautioned about boot/beach\par arch support

## 2023-05-18 NOTE — PHYSICAL EXAM
[Right] : right foot and ankle [Positive Tinel sign at _____] : Positive Tinel sign at [unfilled] [2+] : dorsalis pedis pulse: 2+ [] : no achilles tendon insertion tenderness [de-identified] : decreased to hallux  (baseline)

## 2023-05-18 NOTE — HISTORY OF PRESENT ILLNESS
[4] : 4 [2] : 2 [Burning] : burning [Tightness] : tightness [Tingling] : tingling [Full time] : Work status: full time [de-identified] : 02/07/2023: 57 year old F here for eval of R foot pain. Started with feeling like rock under forefoot 5/2022 Also has numbness/ tingling into toes, worst in big toe. Previous Dr thought she had a neuroma. Had tried CSI into suspected neuroma but did not get relief. Has done PT with some help. Has not tried toe/ foot pad. Got EMG which she said showed "a pinched nerve in her back". Switched to Cymbalta from Neurontin by Neuro.\par \par  No previous L Spine MRI. \par \par No h/o neuropathy \par \par 3/30/23: f/u \par \par 4/17/23  R ganglion excision/tarsal tunnel/plantar fascia\par \par 04/27/23: 1st PO f/u. No F/C. Pt states slight decrease in pain. No nausea since sx. Pt states feels bubbles on the back of her calf. \par 05/04/23: 2nd PO f/u:  Pt is concerned about redness in the incision area. Continued burning to the big toe, feels that has gotten worse. She reports no fevers/chills. She started keflex yesterday, notes improvement in the redness. WBAT in the CAM boot\par 05/11/23: Pain level has decrease. bruising and swelling has decrease. Overall, improvement. No f/c\par 05/18/2023: Follow up on r foot. Patient states she has been getting a shock shooting into her big toe. Overall pain has improved with CAm boot on. 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

## 2023-06-01 ENCOUNTER — APPOINTMENT (OUTPATIENT)
Dept: ORTHOPEDIC SURGERY | Facility: CLINIC | Age: 58
End: 2023-06-01
Payer: COMMERCIAL

## 2023-06-01 PROCEDURE — 99024 POSTOP FOLLOW-UP VISIT: CPT

## 2023-06-01 NOTE — HISTORY OF PRESENT ILLNESS
[5] : 5 [3] : 3 [Burning] : burning [Tightness] : tightness [Tingling] : tingling [Full time] : Work status: full time [de-identified] : 02/07/2023: 57 year old F here for eval of R foot pain. Started with feeling like rock under forefoot 5/2022 Also has numbness/ tingling into toes, worst in big toe. Previous Dr thought she had a neuroma. Had tried CSI into suspected neuroma but did not get relief. Has done PT with some help. Has not tried toe/ foot pad. Got EMG which she said showed "a pinched nerve in her back". Switched to Cymbalta from Neurontin by Neuro.\par \par  No previous L Spine MRI. \par \par No h/o neuropathy \par \par 3/30/23: f/u \par \par 4/17/23  R ganglion excision/tarsal tunnel/plantar fascia\par \par 04/27/23: 1st PO f/u. No F/C. Pt states slight decrease in pain. No nausea since sx. Pt states feels bubbles on the back of her calf. \par 05/04/23: 2nd PO f/u:  Pt is concerned about redness in the incision area. Continued burning to the big toe, feels that has gotten worse. She reports no fevers/chills. She started keflex yesterday, notes improvement in the redness. WBAT in the CAM boot\par 05/11/23: Pain level has decrease. bruising and swelling has decrease. Overall, improvement. No f/c\par 06/01/2023: follow up on right foot. 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

## 2023-06-01 NOTE — PHYSICAL EXAM
[Right] : right foot and ankle [Positive Tinel sign at _____] : Positive Tinel sign at [unfilled] [2+] : dorsalis pedis pulse: 2+ [] : no achilles tendon insertion tenderness [de-identified] : decreased to hallux  (baseline)

## 2023-06-29 ENCOUNTER — FORM ENCOUNTER (OUTPATIENT)
Age: 58
End: 2023-06-29

## 2023-07-20 ENCOUNTER — APPOINTMENT (OUTPATIENT)
Dept: ORTHOPEDIC SURGERY | Facility: CLINIC | Age: 58
End: 2023-07-20
Payer: COMMERCIAL

## 2023-07-20 DIAGNOSIS — M77.42 METATARSALGIA, LEFT FOOT: ICD-10-CM

## 2023-07-20 DIAGNOSIS — M67.40 GANGLION, UNSPECIFIED SITE: ICD-10-CM

## 2023-07-20 DIAGNOSIS — M77.40 METATARSALGIA, UNSPECIFIED FOOT: ICD-10-CM

## 2023-07-20 PROCEDURE — 99213 OFFICE O/P EST LOW 20 MIN: CPT

## 2023-07-20 NOTE — PHYSICAL EXAM
[Right] : right foot and ankle [Positive Tinel sign at _____] : Positive Tinel sign at [unfilled] [2+] : dorsalis pedis pulse: 2+ [] : no achilles tendon insertion tenderness [de-identified] : decreased to hallux  (baseline)

## 2023-07-20 NOTE — HISTORY OF PRESENT ILLNESS
[5] : 5 [3] : 3 [Burning] : burning [Tightness] : tightness [Tingling] : tingling [Full time] : Work status: full time [de-identified] : 02/07/2023: 57 year old F here for eval of R foot pain. Started with feeling like rock under forefoot 5/2022 Also has numbness/ tingling into toes, worst in big toe. Previous Dr thought she had a neuroma. Had tried CSI into suspected neuroma but did not get relief. Has done PT with some help. Has not tried toe/ foot pad. Got EMG which she said showed "a pinched nerve in her back". Switched to Cymbalta from Neurontin by Neuro.\par \par  No previous L Spine MRI. \par \par No h/o neuropathy \par \par 3/30/23: f/u \par \par 4/17/23  R ganglion excision/tarsal tunnel/plantar fascia\par \par 04/27/23: 1st PO f/u. No F/C. Pt states slight decrease in pain. No nausea since sx. Pt states feels bubbles on the back of her calf. \par 05/04/23: 2nd PO f/u:  Pt is concerned about redness in the incision area. Continued burning to the big toe, feels that has gotten worse. She reports no fevers/chills. She started keflex yesterday, notes improvement in the redness. WBAT in the CAM boot\par 05/11/23: Pain level has decrease. bruising and swelling has decrease. Overall, improvement. No f/c\par 06/01/2023: follow up on right foot. WB CAM boot  Arch support Pain imptoved\par 07/20/23: f/u r foot. Pain level stayed the same. PT 1x a week, pt states she thinks it's helping. Still experiencing numbness.  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

## 2023-08-31 ENCOUNTER — APPOINTMENT (OUTPATIENT)
Dept: ORTHOPEDIC SURGERY | Facility: CLINIC | Age: 58
End: 2023-08-31

## 2024-02-22 ENCOUNTER — APPOINTMENT (OUTPATIENT)
Dept: ORTHOPEDIC SURGERY | Facility: CLINIC | Age: 59
End: 2024-02-22
Payer: COMMERCIAL

## 2024-02-22 DIAGNOSIS — M54.16 RADICULOPATHY, LUMBAR REGION: ICD-10-CM

## 2024-02-22 DIAGNOSIS — G57.51 TARSAL TUNNEL SYNDROME, RIGHT LOWER LIMB: ICD-10-CM

## 2024-02-22 PROCEDURE — 99213 OFFICE O/P EST LOW 20 MIN: CPT

## 2024-02-22 NOTE — PHYSICAL EXAM
[Right] : right foot and ankle [Positive Tinel sign at _____] : Positive Tinel sign at [unfilled] [2+] : dorsalis pedis pulse: 2+ [] : no achilles tendon insertion tenderness [de-identified] : decreased to hallux  (baseline)

## 2024-02-22 NOTE — HISTORY OF PRESENT ILLNESS
[5] : 5 [3] : 3 [Burning] : burning [Tightness] : tightness [Tingling] : tingling [Full time] : Work status: full time [de-identified] : 02/07/2023: 57 year old F here for eval of R foot pain. Started with feeling like rock under forefoot 5/2022 Also has numbness/ tingling into toes, worst in big toe. Previous Dr thought she had a neuroma. Had tried CSI into suspected neuroma but did not get relief. Has done PT with some help. Has not tried toe/ foot pad. Got EMG which she said showed "a pinched nerve in her back". Switched to Cymbalta from Neurontin by Neuro.   No previous L Spine MRI.   No h/o neuropathy   3/30/23: f/u   4/17/23  R ganglion excision/tarsal tunnel/plantar fascia  04/27/23: 1st PO f/u. No F/C. Pt states slight decrease in pain. No nausea since sx. Pt states feels bubbles on the back of her calf.  05/04/23: 2nd PO f/u:  Pt is concerned about redness in the incision area. Continued burning to the big toe, feels that has gotten worse. She reports no fevers/chills. She started keflex yesterday, notes improvement in the redness. WBAT in the CAM boot 05/11/23: Pain level has decrease. bruising and swelling has decrease. Overall, improvement. No f/c 06/01/2023: follow up on right foot. WB CAM boot  Arch support Pain imptoved 02/22/24: follow up on right foot. States arch supports which help. 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

## 2024-03-25 ENCOUNTER — APPOINTMENT (OUTPATIENT)
Dept: PAIN MANAGEMENT | Facility: CLINIC | Age: 59
End: 2024-03-25

## 2025-05-24 NOTE — ASU PATIENT PROFILE, ADULT - PRO MENTAL HEALTH SX RECENT
[facility]/Population Health Chart Review & Patient Outreach Details For Medication Adherence Project    Reason for Outreach Encounter: 3rd Party payor non-compliance report ([employer], [employer], [employer], etc)  2.  Patient Outreach Method: Reviewed patient chart   3.   Medication in question:    Hyperlipidemia Medications              evolocumab (REPATHA SURECLICK) 140 mg/mL PnIj Inject 1 mL (140 mg total) into the skin every 14 (fourteen) days.    lovastatin (MEVACOR) 20 MG tablet Take 1 tablet (20 mg total) by mouth every evening.            Lovastatin d/c 11/12/24    4.  Reviewed and or Updates Made To: Patient Chart  5. Outreach Outcomes and/or actions taken: Medication discontinued  Additional Notes:        none

## (undated) DEVICE — SOL IRR POUR H2O 500ML

## (undated) DEVICE — DRAPE IRRIGATION POUCH 19X23"

## (undated) DEVICE — MYOSURE SCOPE SEAL

## (undated) DEVICE — DRAPE LIGHT HANDLE COVER (GREEN)

## (undated) DEVICE — TUBING IRR SET FOR CYSTOSCOPY 77"

## (undated) DEVICE — DRSG TELFA 3 X 8

## (undated) DEVICE — POSITIONER STRAP ARMBOARD VELCRO TS-30

## (undated) DEVICE — TUBING SUCTION NONCONDUCTIVE 6MM X 12FT

## (undated) DEVICE — WARMING BLANKET FULL ADULT

## (undated) DEVICE — PACK PERI GYN

## (undated) DEVICE — PREP BETADINE SPONGE STICKS

## (undated) DEVICE — GOWN LG

## (undated) DEVICE — BASIN SET DOUBLE

## (undated) DEVICE — FLUENT FMS PROCEDURE KIT

## (undated) DEVICE — VISITEC 4X4

## (undated) DEVICE — LABELS BLANK W PEN

## (undated) DEVICE — PRESSURE INFUSOR BAG 1000ML

## (undated) DEVICE — DRSG PAD SANITARY OB

## (undated) DEVICE — VENODYNE/SCD SLEEVE CALF MEDIUM

## (undated) DEVICE — DRAPE TOWEL BLUE 17" X 24"

## (undated) DEVICE — SOL IRR POUR NS 0.9% 1000ML